# Patient Record
Sex: MALE | Race: WHITE | NOT HISPANIC OR LATINO | URBAN - METROPOLITAN AREA
[De-identification: names, ages, dates, MRNs, and addresses within clinical notes are randomized per-mention and may not be internally consistent; named-entity substitution may affect disease eponyms.]

---

## 2020-02-17 ENCOUNTER — EMERGENCY (EMERGENCY)
Facility: HOSPITAL | Age: 60
LOS: 1 days | Discharge: HOME | End: 2020-02-17
Attending: EMERGENCY MEDICINE | Admitting: EMERGENCY MEDICINE
Payer: MEDICARE

## 2020-02-17 VITALS
OXYGEN SATURATION: 100 % | WEIGHT: 154.98 LBS | RESPIRATION RATE: 18 BRPM | TEMPERATURE: 98 F | DIASTOLIC BLOOD PRESSURE: 90 MMHG | SYSTOLIC BLOOD PRESSURE: 143 MMHG | HEART RATE: 66 BPM

## 2020-02-17 LAB
ALBUMIN SERPL ELPH-MCNC: 4.4 G/DL — SIGNIFICANT CHANGE UP (ref 3.3–5)
ALP SERPL-CCNC: 79 U/L — SIGNIFICANT CHANGE UP (ref 40–120)
ALT FLD-CCNC: 20 U/L — SIGNIFICANT CHANGE UP (ref 10–45)
ANION GAP SERPL CALC-SCNC: 12 MMOL/L — SIGNIFICANT CHANGE UP (ref 5–17)
AST SERPL-CCNC: 20 U/L — SIGNIFICANT CHANGE UP (ref 10–40)
BASOPHILS # BLD AUTO: 0.03 K/UL — SIGNIFICANT CHANGE UP (ref 0–0.2)
BASOPHILS NFR BLD AUTO: 0.4 % — SIGNIFICANT CHANGE UP (ref 0–2)
BILIRUB SERPL-MCNC: 0.2 MG/DL — SIGNIFICANT CHANGE UP (ref 0.2–1.2)
BUN SERPL-MCNC: 16 MG/DL — SIGNIFICANT CHANGE UP (ref 7–23)
CALCIUM SERPL-MCNC: 8.9 MG/DL — SIGNIFICANT CHANGE UP (ref 8.4–10.5)
CHLORIDE SERPL-SCNC: 105 MMOL/L — SIGNIFICANT CHANGE UP (ref 96–108)
CO2 SERPL-SCNC: 25 MMOL/L — SIGNIFICANT CHANGE UP (ref 22–31)
CREAT SERPL-MCNC: 0.96 MG/DL — SIGNIFICANT CHANGE UP (ref 0.5–1.3)
EOSINOPHIL # BLD AUTO: 0.31 K/UL — SIGNIFICANT CHANGE UP (ref 0–0.5)
EOSINOPHIL NFR BLD AUTO: 4.2 % — SIGNIFICANT CHANGE UP (ref 0–6)
ETHANOL SERPL-MCNC: <10 MG/DL — SIGNIFICANT CHANGE UP (ref 0–10)
GLUCOSE SERPL-MCNC: 111 MG/DL — HIGH (ref 70–99)
HCT VFR BLD CALC: 40.6 % — SIGNIFICANT CHANGE UP (ref 39–50)
HGB BLD-MCNC: 13.7 G/DL — SIGNIFICANT CHANGE UP (ref 13–17)
IMM GRANULOCYTES NFR BLD AUTO: 0.5 % — SIGNIFICANT CHANGE UP (ref 0–1.5)
LYMPHOCYTES # BLD AUTO: 2.87 K/UL — SIGNIFICANT CHANGE UP (ref 1–3.3)
LYMPHOCYTES # BLD AUTO: 38.6 % — SIGNIFICANT CHANGE UP (ref 13–44)
MCHC RBC-ENTMCNC: 27.7 PG — SIGNIFICANT CHANGE UP (ref 27–34)
MCHC RBC-ENTMCNC: 33.7 GM/DL — SIGNIFICANT CHANGE UP (ref 32–36)
MCV RBC AUTO: 82 FL — SIGNIFICANT CHANGE UP (ref 80–100)
MONOCYTES # BLD AUTO: 0.55 K/UL — SIGNIFICANT CHANGE UP (ref 0–0.9)
MONOCYTES NFR BLD AUTO: 7.4 % — SIGNIFICANT CHANGE UP (ref 2–14)
NEUTROPHILS # BLD AUTO: 3.63 K/UL — SIGNIFICANT CHANGE UP (ref 1.8–7.4)
NEUTROPHILS NFR BLD AUTO: 48.9 % — SIGNIFICANT CHANGE UP (ref 43–77)
NRBC # BLD: 0 /100 WBCS — SIGNIFICANT CHANGE UP (ref 0–0)
PLATELET # BLD AUTO: 314 K/UL — SIGNIFICANT CHANGE UP (ref 150–400)
POTASSIUM SERPL-MCNC: 4.4 MMOL/L — SIGNIFICANT CHANGE UP (ref 3.5–5.3)
POTASSIUM SERPL-SCNC: 4.4 MMOL/L — SIGNIFICANT CHANGE UP (ref 3.5–5.3)
PROT SERPL-MCNC: 7 G/DL — SIGNIFICANT CHANGE UP (ref 6–8.3)
RBC # BLD: 4.95 M/UL — SIGNIFICANT CHANGE UP (ref 4.2–5.8)
RBC # FLD: 12.8 % — SIGNIFICANT CHANGE UP (ref 10.3–14.5)
SODIUM SERPL-SCNC: 142 MMOL/L — SIGNIFICANT CHANGE UP (ref 135–145)
TSH SERPL-MCNC: 1.19 UIU/ML — SIGNIFICANT CHANGE UP (ref 0.35–4.94)
WBC # BLD: 7.43 K/UL — SIGNIFICANT CHANGE UP (ref 3.8–10.5)
WBC # FLD AUTO: 7.43 K/UL — SIGNIFICANT CHANGE UP (ref 3.8–10.5)

## 2020-02-17 PROCEDURE — 80307 DRUG TEST PRSMV CHEM ANLYZR: CPT

## 2020-02-17 PROCEDURE — 90792 PSYCH DIAG EVAL W/MED SRVCS: CPT

## 2020-02-17 PROCEDURE — 80053 COMPREHEN METABOLIC PANEL: CPT

## 2020-02-17 PROCEDURE — 84443 ASSAY THYROID STIM HORMONE: CPT

## 2020-02-17 PROCEDURE — 99284 EMERGENCY DEPT VISIT MOD MDM: CPT

## 2020-02-17 PROCEDURE — 99285 EMERGENCY DEPT VISIT HI MDM: CPT

## 2020-02-17 PROCEDURE — 36415 COLL VENOUS BLD VENIPUNCTURE: CPT

## 2020-02-17 PROCEDURE — 85025 COMPLETE CBC W/AUTO DIFF WBC: CPT

## 2020-02-17 NOTE — ED ADULT NURSE NOTE - NSIMPLEMENTINTERV_GEN_ALL_ED
hard copy, drawn during this pregnancy
Implemented All Universal Safety Interventions:  [place] to call system. Call bell, personal items and telephone within reach. Instruct patient to call for assistance. Room bathroom lighting operational. Non-slip footwear when patient is off stretcher. Physically safe environment: no spills, clutter or unnecessary equipment. Stretcher in lowest position, wheels locked, appropriate side rails in place.

## 2020-02-17 NOTE — ED PROVIDER NOTE - NSFOLLOWUPINSTRUCTIONS_ED_ALL_ED_FT
Please follow up with Hand [facility] at [facility] tomorrow as already planned.

## 2020-02-17 NOTE — ED BEHAVIORAL HEALTH ASSESSMENT NOTE - RISK ASSESSMENT
Chronic risk is slighlty elevated given male gender, HIV, living alone, being single without dependents, and not being in consistent psychiatric treatment. Chronic risk factors would not be modified by an inpatient hospitalization. Patient motivated to seek outpatient treatment and provided with a list of referrals. Low Acute Suicide Risk

## 2020-02-17 NOTE — ED BEHAVIORAL HEALTH ASSESSMENT NOTE - SUICIDE PROTECTIVE FACTORS
Supportive social network of family or friends/Positive therapeutic relationships/Ability to cope with stress/Responsibility to family and others/Frustration tolerance/Identifies reasons for living/Has future plans/Cultural, spiritual and/or moral attitudes against suicide/Engaged in work or school

## 2020-02-17 NOTE — ED ADULT NURSE NOTE - OBJECTIVE STATEMENT
59 y.o M a&ox4 walked in from front triage c.o wound check. Pt states "I got a thorn in my finger and the blister was operated on yesterday and I need this checked." no swelling, pus, discharge from site. + flight of ideas. speaking in manic sentences, easily angered when redirected. PT states " my psychiatrist is dead, I would like to speak with someone. I don't want to jump off a bridge. I am an aristocrat. My mother slept with a carter. I have so many things." no SI/HI. denies SI attempts. no ETOH, dug, smoking use. PT brought to north side. charge aware. constant observation in place. security called to wan belongings.

## 2020-02-17 NOTE — ED BEHAVIORAL HEALTH ASSESSMENT NOTE - SUMMARY
Mr. Miranda is a 59 year old man, [place] descent, domiciled alone, single without children, with a medical history of well controlled HIV, a psychiatric history of MDD and anxiety, one prior hospitalization, without suicide attempts or violence, who was BIBS with complaints of an infection on his finger. Psychiatry was consulted per patient's request due to difficulty finding outside therapist.     On interview, the patient was loud, pressured, tangential, with a normal affect. There was initial concern for grandiose delusions, however, sister able to corroborate patient's statements about having famous friends. There is concern for ruben, however, patient reporting normal sleep and sister and psychiatrist reporting patient is at his baseline. There are no safety concerns, as patient is denying SI, HI, and collateral also denying safety concerns. Patient is psychiatrically stable for discharge.

## 2020-02-17 NOTE — ED BEHAVIORAL HEALTH ASSESSMENT NOTE - OTHER PAST PSYCHIATRIC HISTORY (INCLUDE DETAILS REGARDING ONSET, COURSE OF ILLNESS, INPATIENT/OUTPATIENT TREATMENT)
Patient reported one hospitalization in  in [place] for one month for "sex addiction." Reported he was seeing a therapist multiple times a week, though they  in 2019 and he has not been in therapy since then.

## 2020-02-17 NOTE — ED BEHAVIORAL HEALTH ASSESSMENT NOTE - DETAILS
Emergency History of suicidal ideatoin History of suicidal ideation Did not disclose details Finger pain Plan reviewed verbally with Dr. Partida

## 2020-02-17 NOTE — ED ADULT TRIAGE NOTE - CHIEF COMPLAINT QUOTE
per pt "I got a thorn in my finger and the blister was operated on yesterday and I need this checked"

## 2020-02-17 NOTE — ED BEHAVIORAL HEALTH ASSESSMENT NOTE - HPI (INCLUDE ILLNESS QUALITY, SEVERITY, DURATION, TIMING, CONTEXT, MODIFYING FACTORS, ASSOCIATED SIGNS AND SYMPTOMS)
Confidential Drug Utilization Report  Search Terms: kristen cesar, 1960   Search Date: 02/17/2020 09:30:56 PM   This report was requested by: Brandy Villa | Reference #: [id number]   Patient Name: Kristen Cesar YOB: 1960   Address: [address] [id number] Sex: Male   12/24/2019 01/22/2020 clonazepam 1 mg tablet  60 30 Dima Headley MD     12/23/2019 12/24/2019 clonazepam 1 mg tablet  60 30 Dima Headley MD     11/24/2019 11/25/2019 clonazepam 1 mg tablet  60 30 FriLuba ndiaye NP     10/15/2019 10/19/2019 clonazepam 1 mg tablet  60 30 FrickenhLuba kim NP     09/16/2019 09/20/2019 clonazepam 1 mg tablet  60 30 FriLuba ndiaye NP     07/23/2019 08/22/2019 clonazepam 1 mg tablet  60 30 Luba Mcintyre NP     07/18/2019 07/23/2019 clonazepam 1 mg tablet  60 30 Dima Headley MD     06/21/2019 06/22/2019 clonazepam 1 mg tablet  60 30 Dima Headley MD     05/15/2019 05/23/2019 clonazepam 1 mg tablet  60 30 Luba Mcintyre NP     04/18/2019 04/22/2019 clonazepam 1 mg tablet  60 30 Luba Mcintyre NP     03/19/2019 03/22/2019 clonazepam 1 mg tablet  60 30 Luba Mcintyre NP     02/21/2019 02/21/2019 clonazepam 1 mg tablet  60 30 FrickenhLuba kim NP Mr. Miranda is a 59 year old man, Hebrew descent, domiciled alone, single without children, with a medical history of well controlled HIV, a psychiatric history of MDD and anxiety, one prior hospitalization, without suicide attempts or violence, who was BIBS with complaints of an infection on his finger. Psychiatry was consulted per patient's request due to difficulty finding outside therapist.     The patient reported he was picking up chocolate strawberries for his lover "big daddy" on Tuesday and accidentally cut his finger "on a thorn." Over the coming days, he reported he noticed his finger becoming swollen, painful, and developing pus. He wanted to come to the hospital due to concern for infection given his HIV status. He reported compliance with sertraline 150 mg po daily, prescribed by his outpatient psychiatrist Dr. Hernandes, who he saw on Friday. He also is prescribed klonopin 1 mg poq12h which he takes intermittently, reported he took two tabs of klonopin total this week. He denied medication changes at his last appointment. He reported he is looking for a new therapist because his previous therapist  and he (the patient) is a "raving lunatic. I know I am." Patient denied drug use, though reported he had one shot of alcohol this week when he went to a "very chic nightclub owned by my friend Franny Aponte." The patient asked if he was going to be admitted, stating "I know I'm crazy." He denied suicidal ideation, homicidal ideation, depressed mood, anxiety, AVH, paranoid ideation, euphoria. Reported his only concern is finding a new therapist. He provided contact information for his sister Bella and outpatient therapist.    Per his sister, she spoke with the patient earlier today and he seemed "out of sorts over his finger." However, he otherwise has seemed like his usual self. 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 She reported she has no concerns about the patient's safety, though she would like him to see a new therapist.    Per Dr. Hernandes, he has being treating the patient for more than ten years and last saw the patient on Friday. He reported the patient was at his baseline and he had no safety concerns. The patient's diagnosis is MDD and he denied a history of ruben, stating he is always like that. The patient has intermittent compliance, at times no showing or cancelling appointments. The patient's previous therapist did pass away in .

## 2020-02-17 NOTE — ED ADULT TRIAGE NOTE - OTHER COMPLAINTS
presents to check to left 3rd digit wound check and bandage change. PT noted psych hx denies SI, HI, no hallucinations nor delusions.

## 2020-02-17 NOTE — ED PROVIDER NOTE - CLINICAL SUMMARY MEDICAL DECISION MAKING FREE TEXT BOX
abscess to left middle finger. wound cleansed and sterile dressing applied. continue antibiotics. pt has f/u with [facility] hand clinic tomorrow. pressured speech, manic and flight of ideas. labs sent and psych consulted.

## 2020-02-17 NOTE — ED PROVIDER NOTE - OBJECTIVE STATEMENT
60 yo male with hx of depression c/.o wound check.. Pt states I&D done at [facility] to left middle finger after stuck with thorn. Pt notes told to change the dressing today but unable to due on his own. no fever or chills. pt taking antibiotics. Also pt talking to chair while in hallway. pt denies SI, HI, AH or VH. pt denies recent drug use. No further complaints,.

## 2020-02-17 NOTE — ED BEHAVIORAL HEALTH ASSESSMENT NOTE - DESCRIPTION
Patient waiting in hospital chair. Conversing with multiple staff members, talking aloud to self. Patient waiting in hospital chair. Conversing with multiple staff members, talking aloud to self.  Confidential Drug Utilization Report  Search Terms: kristen cesar, 1960   Search Date: 02/17/2020 09:30:56 PM   This report was requested by: Brandy Villa | Reference #: [id number]   Patient Name: Kristen Cesar YOB: 1960   Address: [address] [place] [address] Sex: Male   12/24/2019 01/22/2020 clonazepam 1 mg tablet  60 30 Dima Headley MD     12/23/2019 12/24/2019 clonazepam 1 mg tablet  60 30 Dima Headley MD     11/24/2019 11/25/2019 clonazepam 1 mg tablet  60 30 Luba Mcintyre NP     10/15/2019 10/19/2019 clonazepam 1 mg tablet  60 30 Luba Mcintyre NP     09/16/2019 09/20/2019 clonazepam 1 mg tablet  60 30 Luba Mcintyre NP     07/23/2019 08/22/2019 clonazepam 1 mg tablet  60 30 FriLuba ndiaye NP     07/18/2019 07/23/2019 clonazepam 1 mg tablet  60 30 Dima Headley MD     06/21/2019 06/22/2019 clonazepam 1 mg tablet  60 30 Dima Headley MD     05/15/2019 05/23/2019 clonazepam 1 mg tablet  60 30 Luba Mcintyre NP     04/18/2019 04/22/2019 clonazepam 1 mg tablet  60 30 Luba Mcintyre NP     03/19/2019 03/22/2019 clonazepam 1 mg tablet  60 30 Luab Mcintyre NP     02/21/2019 02/21/2019 clonazepam 1 mg tablet  60 30 Luba Mcintyre NP HIV, well controlled Lives alone on [address], born in [place] and moved [place] age 1, was adopted

## 2020-02-17 NOTE — ED ADULT NURSE REASSESSMENT NOTE - NS ED NURSE REASSESS COMMENT FT1
Pt. was received from [facility], [facility].  Pt. is currently with a PCA [place] at the bedside for safety observation.  Psychiatrist is speaking with the pt. at this moment.  Will continue to monitor.

## 2020-02-17 NOTE — ED PROVIDER NOTE - PATIENT PORTAL LINK FT
You can access the FollowMyHealth Patient Portal offered by [facility] by registering at the following website: [email or web address]. By joining [facility]’s FollowMyHealth portal, you will also be able to view your health information using other applications (apps) compatible with our system.

## 2020-02-21 LAB — DRUG SCREEN, SERUM: SIGNIFICANT CHANGE UP

## 2020-02-22 DIAGNOSIS — L02.512 CUTANEOUS ABSCESS OF LEFT HAND: ICD-10-CM

## 2020-02-22 DIAGNOSIS — F33.9 MAJOR DEPRESSIVE DISORDER, RECURRENT, UNSPECIFIED: ICD-10-CM

## 2024-08-05 NOTE — ED ADULT TRIAGE NOTE - ESI TRIAGE ACUITY LEVEL, MLM
Medication:     zolpidem (AMBIEN)       Dose/Frequency: Take 1 tablet (10 mg total) by mouth daily at bedtime as needed for sleep     Quantity: 30    Pharmacy: [id number]/pharmacy #[id number] - [place] - [address]     Office:   [x] PCP/Provider -   [] Speciality/Provider -     Does the patient have enough for 3 days?   [] Yes   [x] No - Send as HP to POD     
4
No complaints